# Patient Record
Sex: MALE
[De-identification: names, ages, dates, MRNs, and addresses within clinical notes are randomized per-mention and may not be internally consistent; named-entity substitution may affect disease eponyms.]

---

## 2018-06-08 PROBLEM — Z00.00 ENCOUNTER FOR PREVENTIVE HEALTH EXAMINATION: Noted: 2018-06-08

## 2018-06-25 ENCOUNTER — APPOINTMENT (OUTPATIENT)
Dept: UROLOGY | Facility: CLINIC | Age: 48
End: 2018-06-25
Payer: MEDICAID

## 2018-06-25 VITALS
RESPIRATION RATE: 16 BRPM | HEIGHT: 62 IN | BODY MASS INDEX: 22.63 KG/M2 | SYSTOLIC BLOOD PRESSURE: 110 MMHG | DIASTOLIC BLOOD PRESSURE: 74 MMHG | OXYGEN SATURATION: 98 % | WEIGHT: 123 LBS | HEART RATE: 69 BPM | TEMPERATURE: 97.7 F

## 2018-06-25 DIAGNOSIS — Z78.9 OTHER SPECIFIED HEALTH STATUS: ICD-10-CM

## 2018-06-25 DIAGNOSIS — Z84.2 FAMILY HISTORY OF OTHER DISEASES OF THE GENITOURINARY SYSTEM: ICD-10-CM

## 2018-06-25 DIAGNOSIS — G25.2 OTHER SPECIFIED FORMS OF TREMOR: ICD-10-CM

## 2018-06-25 DIAGNOSIS — Z86.69 PERSONAL HISTORY OF OTHER DISEASES OF THE NERVOUS SYSTEM AND SENSE ORGANS: ICD-10-CM

## 2018-06-25 PROCEDURE — 99204 OFFICE O/P NEW MOD 45 MIN: CPT

## 2018-06-25 RX ORDER — LATANOPROST/PF 0.005 %
0.01 DROPS OPHTHALMIC (EYE)
Refills: 0 | Status: ACTIVE | COMMUNITY

## 2018-06-25 RX ORDER — BRIMONIDINE TARTRATE, TIMOLOL MALEATE 2; 5 MG/ML; MG/ML
0.2-0.5 SOLUTION/ DROPS OPHTHALMIC
Refills: 0 | Status: ACTIVE | COMMUNITY

## 2019-09-19 ENCOUNTER — APPOINTMENT (OUTPATIENT)
Dept: UROLOGY | Facility: CLINIC | Age: 49
End: 2019-09-19
Payer: MEDICAID

## 2019-09-19 VITALS
HEIGHT: 62 IN | HEART RATE: 73 BPM | SYSTOLIC BLOOD PRESSURE: 126 MMHG | WEIGHT: 140 LBS | RESPIRATION RATE: 14 BRPM | OXYGEN SATURATION: 94 % | DIASTOLIC BLOOD PRESSURE: 80 MMHG | BODY MASS INDEX: 25.76 KG/M2

## 2019-09-19 PROCEDURE — 99214 OFFICE O/P EST MOD 30 MIN: CPT

## 2019-09-19 NOTE — ASSESSMENT
[FreeTextEntry1] : In order to improve nocturia, he should continue to limit fluid intake at nights. For one to 2 weeks, increase Flomax to 0.8 mg at night to see if symptoms improve. If it helps, family will get back to me to order it for him. We had discussed elevated PSA level at the previous visit and also today. Biopsy of the prostate and its risks were reviewed. For now, family has decided on observation due to PSA stability and his overall medical condition. He can follow up in 6 months or one year.

## 2019-09-19 NOTE — PHYSICAL EXAM
[General Appearance - In No Acute Distress] : no acute distress [Abdomen Soft] : soft [Abdomen Tenderness] : non-tender [Costovertebral Angle Tenderness] : no ~M costovertebral angle tenderness [Urethral Meatus] : meatus normal [Penis Abnormality] : normal circumcised penis [Urinary Bladder Findings] : the bladder was normal on palpation [Scrotum] : the scrotum was normal [Testes Tenderness] : no tenderness of the testes [Testes Mass (___cm)] : there were no testicular masses [Prostate Tenderness] : the prostate was not tender [No Prostate Nodules] : no prostate nodules [FreeTextEntry1] : NICKOLAS done 9/2019, prostate mildly enlarged. [] : no respiratory distress [Not Anxious] : not anxious [Inguinal Lymph Nodes Enlarged Bilaterally] : inguinal

## 2019-09-19 NOTE — HISTORY OF PRESENT ILLNESS
[FreeTextEntry1] : He is a 49-year-old man with cerebral palsy who is seen today with mother and his sister. He is on Flomax once a night. He has nocturia 3-5 times a night. He does not drink much fluid at night. Urinary symptoms are not as frequent in the daytime. Residual urine today was minimal. In September 2019, urinalysis was normal and PSA level was a 5.4. Hemoglobin A1c was 5.9. So far, family has decided on observation of elevated PSA level. His father underwent prostate surgery for enlarged prostate at age 50 in [place].\par Previous notes: According to the family, around February 2018 he underwent a routine examination which showed elevated PSA level of 5.4. PSA level was repeated in June 2018, and it was 13. PVR previously was 250 ml.

## 2020-10-21 ENCOUNTER — APPOINTMENT (OUTPATIENT)
Dept: UROLOGY | Facility: CLINIC | Age: 50
End: 2020-10-21
Payer: MEDICAID

## 2020-10-21 VITALS
SYSTOLIC BLOOD PRESSURE: 137 MMHG | DIASTOLIC BLOOD PRESSURE: 83 MMHG | BODY MASS INDEX: 25.76 KG/M2 | TEMPERATURE: 97.4 F | OXYGEN SATURATION: 97 % | HEIGHT: 62 IN | WEIGHT: 140 LBS | HEART RATE: 80 BPM | RESPIRATION RATE: 14 BRPM

## 2020-10-21 PROCEDURE — 99214 OFFICE O/P EST MOD 30 MIN: CPT

## 2020-10-21 NOTE — ASSESSMENT
[FreeTextEntry1] : At the previous visit and also today we discussed management of elevated PSA level. Prostate biopsy versus MRI of the prostate and then prostate biopsy were discussed again. Risks reviewed. Due to mental status, if they decide to proceed with biopsy, it should be done under anesthesia. Continue Flomax. He should try to limit fluid intake 2 hours before bedtime. Urine culture will be sent if he is able to give us a urine sample. If family decides on observing PSA level, it should be repeated in a few months. No

## 2020-10-21 NOTE — PHYSICAL EXAM
[General Appearance - Well Developed] : well developed [General Appearance - Well Nourished] : well nourished [General Appearance - In No Acute Distress] : no acute distress [Abdomen Soft] : soft [Abdomen Tenderness] : non-tender [Costovertebral Angle Tenderness] : no ~M costovertebral angle tenderness [Urethral Meatus] : meatus normal [Penis Abnormality] : normal circumcised penis [Urinary Bladder Findings] : the bladder was normal on palpation [Scrotum] : the scrotum was normal [Testes Tenderness] : no tenderness of the testes [Testes Mass (___cm)] : there were no testicular masses [Prostate Tenderness] : the prostate was not tender [No Prostate Nodules] : no prostate nodules [Prostate Enlarged] : was enlarged [FreeTextEntry1] : NICKOLAS done 10/2020.  [] : no respiratory distress [Respiration, Rhythm And Depth] : normal respiratory rhythm and effort [Exaggerated Use Of Accessory Muscles For Inspiration] : no accessory muscle use [Inguinal Lymph Nodes Enlarged Bilaterally] : inguinal

## 2020-10-21 NOTE — HISTORY OF PRESENT ILLNESS
[FreeTextEntry1] : He is a 50-year-old man with cerebral palsy who is seen today with his sister. Urinary symptoms are unchanged with nocturia up to 5 times. He does not necessarily limit intake of fluid at night. He is on tamsulosin. Residual urine today was 100 cc. There is no weight loss or bone pain. There is no dysuria or hematuria. So far, family has continued watching elevated PSA level. PSA was 7.69 in October 2020 and 5.4 in 2019. His father underwent prostate surgery for enlarged prostate at age 50 in [place].\par Previous notes: According to the family, around February 2018 he underwent a routine examination which showed elevated PSA level of 5.4. PSA level was repeated in June 2018, and it was 13. PVR previously was 250 ml. He has CP.

## 2020-10-23 LAB — BACTERIA UR CULT: NORMAL

## 2021-11-01 ENCOUNTER — APPOINTMENT (OUTPATIENT)
Dept: UROLOGY | Facility: CLINIC | Age: 51
End: 2021-11-01
Payer: MEDICAID

## 2021-11-01 VITALS
HEART RATE: 79 BPM | RESPIRATION RATE: 15 BRPM | SYSTOLIC BLOOD PRESSURE: 119 MMHG | OXYGEN SATURATION: 97 % | BODY MASS INDEX: 25.76 KG/M2 | HEIGHT: 62 IN | WEIGHT: 140 LBS | TEMPERATURE: 97.8 F | DIASTOLIC BLOOD PRESSURE: 81 MMHG

## 2021-11-01 PROCEDURE — 99213 OFFICE O/P EST LOW 20 MIN: CPT

## 2021-11-01 NOTE — PHYSICAL EXAM
[General Appearance - Well Developed] : well developed [General Appearance - Well Nourished] : well nourished [Abdomen Soft] : soft [Abdomen Tenderness] : non-tender [Costovertebral Angle Tenderness] : no ~M costovertebral angle tenderness [Urethral Meatus] : meatus normal [Penis Abnormality] : normal circumcised penis [Urinary Bladder Findings] : the bladder was normal on palpation [Scrotum] : the scrotum was normal [Testes Tenderness] : no tenderness of the testes [Testes Mass (___cm)] : there were no testicular masses [Prostate Tenderness] : the prostate was not tender [No Prostate Nodules] : no prostate nodules [Prostate Enlarged] : was enlarged [FreeTextEntry1] : NICKOLAS done in November 2021 [] : no respiratory distress [Respiration, Rhythm And Depth] : normal respiratory rhythm and effort [Exaggerated Use Of Accessory Muscles For Inspiration] : no accessory muscle use

## 2021-11-01 NOTE — ASSESSMENT
[FreeTextEntry1] : Residual urine volume is minimal.  He should limit fluids before bedtime.  We discussed management of elevated PSA level several times.  They would like to proceed to the next step and therefore MRI of the prostate was ordered for him pending insurance approval.  We will discuss results and the next step on the phone.  He will continue with tamsulosin.

## 2021-11-01 NOTE — HISTORY OF PRESENT ILLNESS
[FreeTextEntry1] : He is a 51-year-old man with cerebral palsy who is seen today with his sister and mother.  He still drinks plenty of fluids before bedtime and nocturia is up to 5 times.  He is on tamsulosin.  Residual urine today was about 30 cc.  Family is interested in further assessment and work-up for elevated PSA level.  PSA level was 7.6 in October 2021.  There is no weight loss or bone pain.\par PSA was 7.69 in October 2020 and 5.4 in 2019. His father underwent prostate surgery for enlarged prostate at age 50 in [place].\par Previous notes: According to the family, around February 2018 he underwent a routine examination which showed elevated PSA level of 5.4. PSA level was repeated in June 2018, and it was 13. PVR previously was 250 ml. He has CP.

## 2021-11-13 ENCOUNTER — RESULT REVIEW (OUTPATIENT)
Age: 51
End: 2021-11-13

## 2021-11-13 ENCOUNTER — OUTPATIENT (OUTPATIENT)
Dept: OUTPATIENT SERVICES | Facility: HOSPITAL | Age: 51
LOS: 1 days | End: 2021-11-13
Payer: MEDICAID

## 2021-11-13 ENCOUNTER — APPOINTMENT (OUTPATIENT)
Dept: MRI IMAGING | Facility: IMAGING CENTER | Age: 51
End: 2021-11-13
Payer: MEDICAID

## 2021-11-13 DIAGNOSIS — H35.70 UNSPECIFIED SEPARATION OF RETINAL LAYERS: Chronic | ICD-10-CM

## 2021-11-13 DIAGNOSIS — R97.20 ELEVATED PROSTATE SPECIFIC ANTIGEN [PSA]: ICD-10-CM

## 2021-11-13 PROCEDURE — 72197 MRI PELVIS W/O & W/DYE: CPT

## 2021-11-13 PROCEDURE — 72197 MRI PELVIS W/O & W/DYE: CPT | Mod: 26

## 2021-11-13 PROCEDURE — 76498 UNLISTED MR PROCEDURE: CPT

## 2021-11-13 PROCEDURE — 76498P: CUSTOM | Mod: 26

## 2021-11-13 PROCEDURE — A9585: CPT

## 2021-11-16 ENCOUNTER — NON-APPOINTMENT (OUTPATIENT)
Age: 51
End: 2021-11-16

## 2021-12-01 ENCOUNTER — APPOINTMENT (OUTPATIENT)
Dept: UROLOGY | Facility: CLINIC | Age: 51
End: 2021-12-01

## 2021-12-01 VITALS
BODY MASS INDEX: 25.76 KG/M2 | WEIGHT: 140 LBS | HEART RATE: 85 BPM | DIASTOLIC BLOOD PRESSURE: 83 MMHG | SYSTOLIC BLOOD PRESSURE: 143 MMHG | TEMPERATURE: 97.7 F | HEIGHT: 62 IN | OXYGEN SATURATION: 95 % | RESPIRATION RATE: 15 BRPM

## 2022-01-03 ENCOUNTER — APPOINTMENT (OUTPATIENT)
Dept: UROLOGY | Facility: CLINIC | Age: 52
End: 2022-01-03

## 2022-02-03 ENCOUNTER — APPOINTMENT (OUTPATIENT)
Dept: UROLOGY | Facility: CLINIC | Age: 52
End: 2022-02-03
Payer: MEDICAID

## 2022-02-03 ENCOUNTER — APPOINTMENT (OUTPATIENT)
Dept: UROLOGY | Facility: CLINIC | Age: 52
End: 2022-02-03

## 2022-02-03 VITALS — SYSTOLIC BLOOD PRESSURE: 130 MMHG | DIASTOLIC BLOOD PRESSURE: 78 MMHG | TEMPERATURE: 97.9 F

## 2022-02-03 DIAGNOSIS — Z87.898 PERSONAL HISTORY OF OTHER SPECIFIED CONDITIONS: ICD-10-CM

## 2022-02-03 PROCEDURE — 99213 OFFICE O/P EST LOW 20 MIN: CPT

## 2022-02-03 NOTE — PHYSICAL EXAM
[General Appearance - Well Developed] : well developed [General Appearance - Well Nourished] : well nourished [Well Groomed] : well groomed [General Appearance - In No Acute Distress] : no acute distress [Abdomen Soft] : soft [Abdomen Tenderness] : non-tender [Costovertebral Angle Tenderness] : no ~M costovertebral angle tenderness [Urethral Meatus] : meatus normal [Penis Abnormality] : normal circumcised penis [Urinary Bladder Findings] : the bladder was normal on palpation [Scrotum] : the scrotum was normal [Testes Tenderness] : no tenderness of the testes [Testes Mass (___cm)] : there were no testicular masses [Prostate Tenderness] : the prostate was not tender [No Prostate Nodules] : no prostate nodules [Prostate Enlarged] : was enlarged [FreeTextEntry1] : NICKOLAS done in November 2021. [] : no respiratory distress [Respiration, Rhythm And Depth] : normal respiratory rhythm and effort [Exaggerated Use Of Accessory Muscles For Inspiration] : no accessory muscle use

## 2022-02-03 NOTE — HISTORY OF PRESENT ILLNESS
[FreeTextEntry1] : He is a 51-year-old man with cerebral palsy who is seen today with his sister for elevated PSA level.  He is on tamsulosin.  Nocturia is 3 times.  He underwent MRI of the prostate in November 2021 which showed a 63 grams prostate and no MRI suspicious lesions.  Therefore he continued observation.  He is due for PSA level today.  Residual urine volume was minimal previously.  PSA level was 7.6 in October 2021.  \par \par PSA was 7.69 in October 2020 and 5.4 in 2019. His father underwent prostate surgery for enlarged prostate at age 50 in [place].\par Previous notes: According to the family, around February 2018 he underwent a routine examination which showed elevated PSA level of 5.4. PSA level was repeated in June 2018, and it was 13. PVR previously was 250 ml. He has CP.

## 2022-02-03 NOTE — ASSESSMENT
[FreeTextEntry1] : PSA levels were discussed.  MRI was reviewed.  False-negative rate of MRI was discussed.  Nocturia has improved with tamsulosin.  PSA level will be repeated and he can follow-up in 6 months.

## 2022-02-04 ENCOUNTER — NON-APPOINTMENT (OUTPATIENT)
Age: 52
End: 2022-02-04

## 2022-02-04 LAB
PSA FREE FLD-MCNC: 27 %
PSA FREE SERPL-MCNC: 1.76 NG/ML
PSA SERPL-MCNC: 6.59 NG/ML

## 2022-08-03 ENCOUNTER — APPOINTMENT (OUTPATIENT)
Dept: UROLOGY | Facility: CLINIC | Age: 52
End: 2022-08-03

## 2022-08-03 VITALS
OXYGEN SATURATION: 97 % | RESPIRATION RATE: 14 BRPM | SYSTOLIC BLOOD PRESSURE: 153 MMHG | HEART RATE: 69 BPM | BODY MASS INDEX: 25.76 KG/M2 | WEIGHT: 140 LBS | DIASTOLIC BLOOD PRESSURE: 94 MMHG | HEIGHT: 62 IN

## 2022-08-03 PROCEDURE — 99214 OFFICE O/P EST MOD 30 MIN: CPT

## 2022-08-03 RX ORDER — CICLOPIROX OLAMINE 7.7 MG/G
0.77 CREAM TOPICAL
Qty: 90 | Refills: 0 | Status: ACTIVE | COMMUNITY
Start: 2022-05-15

## 2022-08-03 RX ORDER — VALACYCLOVIR 1 G/1
1 TABLET, FILM COATED ORAL
Qty: 30 | Refills: 0 | Status: ACTIVE | COMMUNITY
Start: 2022-06-02

## 2022-08-03 RX ORDER — ACETAMINOPHEN 500 MG
500 TABLET ORAL
Refills: 0 | Status: DISCONTINUED | COMMUNITY
End: 2022-08-03

## 2022-08-03 NOTE — ASSESSMENT
[FreeTextEntry1] : Tamsulosin was refilled.  Side effects were discussed.  Residual urine volume has decreased significantly.  Free and total PSA levels were discussed.  MRI of the prostate was reviewed.  Family is aware that prostate cancer can exist at any PSA level.  PSA level will be repeated today.  Pending results, he will follow-up in 6 months.\par \par Taco Rebolledo MD, FACS\par [facility]\par  of Urology\par \par [address], [address]\[place] [address]\par \par [address], [address]\[place] [address]\par \par Tel: [phone]\par Fax: [phone]

## 2022-08-03 NOTE — HISTORY OF PRESENT ILLNESS
[FreeTextEntry1] : He is a 52-year-old man with cerebral palsy who is seen today with his sister for elevated PSA level.  Nocturia is unchanged, 2-3 times.  He is on tamsulosin.  Residual urine volume today was about 20 mL.  In February 2022, PSA level was 6.59 with 27% free PSA level.  He has no weight loss or bone pain.  There is no hematuria or dysuria.  MRI of the prostate in November 2021 showed a 63 grams prostate and no MRI suspicious lesions.  Therefore he continued observation.  PSA level was 7.6 in October 2021.  \par \par PSA was 7.69 in October 2020 and 5.4 in 2019. His father underwent prostate surgery for enlarged prostate at age 50 in [place].\par \par Previous notes: According to the family, around February 2018 he underwent a routine examination which showed elevated PSA level of 5.4. PSA level was repeated in June 2018, and it was 13. He has CP.

## 2022-08-03 NOTE — PHYSICAL EXAM
[General Appearance - Well Developed] : well developed [General Appearance - Well Nourished] : well nourished [Normal Appearance] : normal appearance [Well Groomed] : well groomed [General Appearance - In No Acute Distress] : no acute distress [Abdomen Soft] : soft [Abdomen Tenderness] : non-tender [Costovertebral Angle Tenderness] : no ~M costovertebral angle tenderness [Urethral Meatus] : meatus normal [Penis Abnormality] : normal circumcised penis [Urinary Bladder Findings] : the bladder was normal on palpation [Scrotum] : the scrotum was normal [Testes Tenderness] : no tenderness of the testes [Testes Mass (___cm)] : there were no testicular masses [Prostate Tenderness] : the prostate was not tender [No Prostate Nodules] : no prostate nodules [Prostate Enlarged] : was enlarged [FreeTextEntry1] : NICKOLAS done in November 2021. [] : no respiratory distress [Respiration, Rhythm And Depth] : normal respiratory rhythm and effort [Exaggerated Use Of Accessory Muscles For Inspiration] : no accessory muscle use [Inguinal Lymph Nodes Enlarged Bilaterally] : inguinal

## 2022-08-04 LAB — PSA SERPL-MCNC: 8.18 NG/ML

## 2023-02-08 ENCOUNTER — APPOINTMENT (OUTPATIENT)
Dept: UROLOGY | Facility: CLINIC | Age: 53
End: 2023-02-08
Payer: MEDICAID

## 2023-02-08 VITALS
WEIGHT: 140 LBS | SYSTOLIC BLOOD PRESSURE: 152 MMHG | OXYGEN SATURATION: 97 % | DIASTOLIC BLOOD PRESSURE: 81 MMHG | TEMPERATURE: 97.1 F | BODY MASS INDEX: 25.76 KG/M2 | HEART RATE: 74 BPM | HEIGHT: 62 IN | RESPIRATION RATE: 14 BRPM

## 2023-02-08 PROCEDURE — 99213 OFFICE O/P EST LOW 20 MIN: CPT

## 2023-02-08 NOTE — PHYSICAL EXAM
[Urethral Meatus] : meatus normal [Penis Abnormality] : normal circumcised penis [Urinary Bladder Findings] : the bladder was normal on palpation [Scrotum] : the scrotum was normal [Testes Tenderness] : no tenderness of the testes [Testes Mass (___cm)] : there were no testicular masses [Prostate Tenderness] : the prostate was not tender [No Prostate Nodules] : no prostate nodules [Prostate Enlarged] : was enlarged [General Appearance - Well Developed] : well developed [General Appearance - Well Nourished] : well nourished [Normal Appearance] : normal appearance [Well Groomed] : well groomed [General Appearance - In No Acute Distress] : no acute distress [Abdomen Soft] : soft [Abdomen Tenderness] : non-tender [Costovertebral Angle Tenderness] : no ~M costovertebral angle tenderness [FreeTextEntry1] : NICKOLAS done previously [] : no respiratory distress [Respiration, Rhythm And Depth] : normal respiratory rhythm and effort [Exaggerated Use Of Accessory Muscles For Inspiration] : no accessory muscle use

## 2023-02-08 NOTE — HISTORY OF PRESENT ILLNESS
[FreeTextEntry1] : He is a 52-year-old man with cerebral palsy who is seen today with his sister for elevated PSA level.  He drinks plenty of water and therefore nocturia is 2 or 3 times.  It does not bother him significantly.  Residual urine volume today was less than 100 mL.  PSA level was 11.5 in November 2022 and 8.18 in August 2022.  He is repeating PSA level again today.  Previously free PSA was 27%.  He has no bone pain no weight loss.\par \par MRI of the prostate in November 2021 showed a 63 grams prostate and no MRI suspicious lesions.  Therefore he continued observation.  His father underwent prostate surgery for enlarged prostate at age 50 in [place].\par \par Previous notes: According to the family, around February 2018 he underwent a routine examination which showed elevated PSA level of 5.4. PSA level was repeated in June 2018, and it was 13.

## 2023-02-08 NOTE — ASSESSMENT
[FreeTextEntry1] : His examination is unchanged.  He empties his bladder well.  Again his PSA levels, prostate MRI and free PSA levels were discussed.  PSA level will be repeated today and if it remains this elevated, he should consider transperineal prostate biopsy.  We will discuss the next step on the phone.

## 2023-02-09 LAB
PSA FREE FLD-MCNC: 27 %
PSA FREE SERPL-MCNC: 1.98 NG/ML
PSA SERPL-MCNC: 7.45 NG/ML

## 2023-08-09 ENCOUNTER — APPOINTMENT (OUTPATIENT)
Dept: UROLOGY | Facility: CLINIC | Age: 53
End: 2023-08-09

## 2023-09-20 ENCOUNTER — APPOINTMENT (OUTPATIENT)
Dept: UROLOGY | Facility: CLINIC | Age: 53
End: 2023-09-20
Payer: MEDICAID

## 2023-09-20 VITALS — DIASTOLIC BLOOD PRESSURE: 89 MMHG | SYSTOLIC BLOOD PRESSURE: 130 MMHG | HEART RATE: 87 BPM | OXYGEN SATURATION: 98 %

## 2023-09-20 DIAGNOSIS — N40.1 BENIGN PROSTATIC HYPERPLASIA WITH LOWER URINARY TRACT SYMPMS: ICD-10-CM

## 2023-09-20 DIAGNOSIS — R35.1 BENIGN PROSTATIC HYPERPLASIA WITH LOWER URINARY TRACT SYMPMS: ICD-10-CM

## 2023-09-20 PROCEDURE — 99213 OFFICE O/P EST LOW 20 MIN: CPT

## 2023-09-20 RX ORDER — TAMSULOSIN HYDROCHLORIDE 0.4 MG/1
0.4 CAPSULE ORAL
Qty: 90 | Refills: 3 | Status: ACTIVE | COMMUNITY
Start: 1900-01-01 | End: 1900-01-01

## 2023-09-21 LAB — PSA SERPL-MCNC: 34.6 NG/ML

## 2023-10-11 DIAGNOSIS — R97.20 ELEVATED PROSTATE, SPECIFIC ANTIGEN [PSA]: ICD-10-CM

## 2023-10-11 LAB
PSA FREE FLD-MCNC: 17 %
PSA FREE SERPL-MCNC: 2.44 NG/ML
PSA SERPL-MCNC: 14.4 NG/ML

## 2023-11-15 LAB — PSA SERPL-MCNC: 10.5 NG/ML

## 2024-09-23 ENCOUNTER — APPOINTMENT (OUTPATIENT)
Dept: UROLOGY | Facility: CLINIC | Age: 54
End: 2024-09-23
Payer: MEDICAID

## 2024-09-23 DIAGNOSIS — R35.1 BENIGN PROSTATIC HYPERPLASIA WITH LOWER URINARY TRACT SYMPMS: ICD-10-CM

## 2024-09-23 DIAGNOSIS — R97.20 ELEVATED PROSTATE, SPECIFIC ANTIGEN [PSA]: ICD-10-CM

## 2024-09-23 DIAGNOSIS — N40.1 BENIGN PROSTATIC HYPERPLASIA WITH LOWER URINARY TRACT SYMPMS: ICD-10-CM

## 2024-09-23 PROCEDURE — G2211 COMPLEX E/M VISIT ADD ON: CPT | Mod: NC

## 2024-09-23 PROCEDURE — 99213 OFFICE O/P EST LOW 20 MIN: CPT

## 2024-09-23 NOTE — PHYSICAL EXAM
[Urethral Meatus] : meatus normal [Penis Abnormality] : normal circumcised penis [Urinary Bladder Findings] : the bladder was normal on palpation [Scrotum] : the scrotum was normal [Testes Tenderness] : no tenderness of the testes [Testes Mass (___cm)] : there were no testicular masses [Prostate Tenderness] : the prostate was not tender [No Prostate Nodules] : no prostate nodules [Prostate Enlarged] : was enlarged [FreeTextEntry1] : Genital exam was done previously. [General Appearance - Well Developed] : well developed [General Appearance - Well Nourished] : well nourished [Normal Appearance] : normal appearance [Well Groomed] : well groomed [] : no respiratory distress [Exaggerated Use Of Accessory Muscles For Inspiration] : no accessory muscle use [Abdomen Soft] : soft [Abdomen Tenderness] : non-tender [Oriented To Time, Place, And Person] : oriented to person, place, and time [Affect] : the affect was normal [Mood] : the mood was normal [Not Anxious] : not anxious

## 2024-09-23 NOTE — ASSESSMENT
[FreeTextEntry1] : He does not seem to be bothered by nocturia.  He will continue with tamsulosin.  PSA levels have fluctuated significantly.  His level will be repeated today and then we will discuss the next up on the phone with the family.  He can follow-up in 6 months pending results.

## 2024-09-24 LAB — PSA SERPL-MCNC: 9.36 NG/ML

## 2025-06-23 ENCOUNTER — APPOINTMENT (OUTPATIENT)
Dept: UROLOGY | Facility: CLINIC | Age: 55
End: 2025-06-23
Payer: MEDICAID

## 2025-06-23 VITALS — DIASTOLIC BLOOD PRESSURE: 88 MMHG | HEART RATE: 78 BPM | OXYGEN SATURATION: 97 % | SYSTOLIC BLOOD PRESSURE: 133 MMHG

## 2025-06-23 PROCEDURE — 99213 OFFICE O/P EST LOW 20 MIN: CPT

## 2025-06-23 PROCEDURE — G2211 COMPLEX E/M VISIT ADD ON: CPT | Mod: NC

## 2025-06-24 LAB
PSA FREE FLD-MCNC: 25 %
PSA FREE SERPL-MCNC: 2.77 NG/ML
PSA SERPL-MCNC: 11 NG/ML